# Patient Record
Sex: MALE | Race: BLACK OR AFRICAN AMERICAN | NOT HISPANIC OR LATINO | URBAN - METROPOLITAN AREA
[De-identification: names, ages, dates, MRNs, and addresses within clinical notes are randomized per-mention and may not be internally consistent; named-entity substitution may affect disease eponyms.]

---

## 2017-02-11 ENCOUNTER — EMERGENCY (EMERGENCY)
Facility: HOSPITAL | Age: 32
LOS: 1 days | Discharge: HOME | End: 2017-02-11
Admitting: EMERGENCY MEDICINE
Payer: MEDICAID

## 2017-02-11 VITALS
HEART RATE: 89 BPM | RESPIRATION RATE: 16 BRPM | OXYGEN SATURATION: 100 % | DIASTOLIC BLOOD PRESSURE: 72 MMHG | SYSTOLIC BLOOD PRESSURE: 149 MMHG

## 2017-02-11 VITALS
TEMPERATURE: 98 F | HEART RATE: 100 BPM | DIASTOLIC BLOOD PRESSURE: 84 MMHG | RESPIRATION RATE: 18 BRPM | SYSTOLIC BLOOD PRESSURE: 171 MMHG | OXYGEN SATURATION: 100 %

## 2017-02-11 PROCEDURE — 99283 EMERGENCY DEPT VISIT LOW MDM: CPT

## 2017-02-11 RX ORDER — ACETAMINOPHEN 500 MG
650 TABLET ORAL ONCE
Qty: 0 | Refills: 0 | Status: COMPLETED | OUTPATIENT
Start: 2017-02-11 | End: 2017-02-11

## 2017-02-11 RX ORDER — IBUPROFEN 200 MG
1 TABLET ORAL
Qty: 10 | Refills: 0 | OUTPATIENT
Start: 2017-02-11

## 2017-02-11 RX ORDER — OXYCODONE HYDROCHLORIDE 5 MG/1
1 TABLET ORAL
Qty: 10 | Refills: 0 | OUTPATIENT
Start: 2017-02-11

## 2017-02-11 RX ADMIN — Medication 650 MILLIGRAM(S): at 11:48

## 2017-02-11 NOTE — ED PROVIDER NOTE - PLAN OF CARE
Follow up with your Dentist or call the clinic to schedule an appointment [phone].  Follow up with your Doctor for blood pressure check.  Soft diet eat on opposite side of mouth.  Take Motrin 600mg every 8 hours as needed for pain take with food.  Percocet 5/325mg  1 tablet every 6 hrs as needed for severe pain. Do not drive or drink alcohol while taking this medications.  Return to the ER for any persistent/worsening or new symptoms swelling, fevers, chills or any concerning symptoms.

## 2017-02-11 NOTE — ED PROVIDER NOTE - OBJECTIVE STATEMENT
32 y/o male with a hx of HTN(not on meds) presents to the ER c/o right lower dental pain x 2 days.  Pt reports gum swelling denies facial swelling.  Pt denies fevers, chills, difficulty eating/drinking, chest pain, shortness of breath.  Pt took Motrin this AM and is driving home.

## 2017-02-11 NOTE — ED PROVIDER NOTE - CARE PLAN
Principal Discharge DX:	Tooth pain Principal Discharge DX:	Tooth pain  Instructions for follow-up, activity and diet:	Follow up with your Dentist or call the clinic to schedule an appointment [phone].  Follow up with your Doctor for blood pressure check.  Soft diet eat on opposite side of mouth.  Take Motrin 600mg every 8 hours as needed for pain take with food.  Percocet 5/325mg  1 tablet every 6 hrs as needed for severe pain. Do not drive or drink alcohol while taking this medications.  Return to the ER for any persistent/worsening or new symptoms swelling, fevers, chills or any concerning symptoms. Principal Discharge DX:	Tooth pain  Instructions for follow-up, activity and diet:	Follow up with your Dentist or call the clinic to schedule an appointment [phone].  Follow up with your Doctor for blood pressure check.  Soft diet eat on opposite side of mouth.  Take Motrin 600mg every 8 hours as needed for pain take with food.  Percocet 5/325mg  1 tablet every 6 hrs as needed for severe pain. Do not drive or drink alcohol while taking this medications.  Return to the ER for any persistent/worsening or new symptoms swelling, fevers, chills or any concerning symptoms.

## 2022-05-12 ENCOUNTER — EMERGENCY (EMERGENCY)
Facility: HOSPITAL | Age: 37
LOS: 1 days | Discharge: HOME | End: 2022-05-12
Attending: EMERGENCY MEDICINE | Admitting: EMERGENCY MEDICINE
Payer: MEDICAID

## 2022-05-12 VITALS
DIASTOLIC BLOOD PRESSURE: 56 MMHG | TEMPERATURE: 98 F | HEART RATE: 77 BPM | RESPIRATION RATE: 18 BRPM | OXYGEN SATURATION: 98 % | SYSTOLIC BLOOD PRESSURE: 134 MMHG

## 2022-05-12 VITALS
HEART RATE: 76 BPM | RESPIRATION RATE: 18 BRPM | SYSTOLIC BLOOD PRESSURE: 142 MMHG | OXYGEN SATURATION: 100 % | DIASTOLIC BLOOD PRESSURE: 50 MMHG | TEMPERATURE: 97 F

## 2022-05-12 PROBLEM — I10 ESSENTIAL (PRIMARY) HYPERTENSION: Chronic | Status: ACTIVE | Noted: 2017-02-11

## 2022-05-12 PROCEDURE — 93010 ELECTROCARDIOGRAM REPORT: CPT

## 2022-05-12 PROCEDURE — 99284 EMERGENCY DEPT VISIT MOD MDM: CPT | Mod: 25

## 2022-05-12 PROCEDURE — 71046 X-RAY EXAM CHEST 2 VIEWS: CPT | Mod: 26

## 2022-05-12 NOTE — ED PROVIDER NOTE - PHYSICAL EXAMINATION
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 able to speak in full and clear sentences. no wheeze, rales or stridor.  ambulatory no distress

## 2022-05-12 NOTE — ED PROVIDER NOTE - PATIENT PORTAL LINK FT
You can access the FollowMyHealth Patient Portal offered by [facility] by registering at the following website: [email or web address]. By joining [facility]’s FollowMyHealth portal, you will also be able to view your health information using other applications (apps) compatible with our system.

## 2022-05-12 NOTE — ED PROVIDER NOTE - OBJECTIVE STATEMENT
Dr Hollis  36yoM denies sig pmhx pw sob x months. Feels "my airways are not open" felt "couldn't breath today" came to ED. pt states he saw his PMD for SOB in March 2022 had been referred for cxr, however not done. no fever no cough no chest pain . no n/v. +smoker daily. no travel. not vaccinated for covid  chart review hx of HTN not on meds.

## 2022-05-12 NOTE — ED ADULT TRIAGE NOTE - CHIEF COMPLAINT QUOTE
pt c/o intermittent chest heaviness and sob x 6 months. states " I feel like im not getting enough oxygen." respirations even and unlabored. no pmh. pt is diaphoretic in triage, states " I feel anxious." pt c/o intermittent chest heaviness and sob x 6 months. states " I feel like im not getting enough oxygen." respirations even and unlabored. no pmh.  states " I feel anxious."

## 2022-05-12 NOTE — ED ADULT NURSE REASSESSMENT NOTE - NS ED NURSE REASSESS COMMENT FT1
pt A&ox4, awake and alert, at this time denies SOB and chest pain, breathing is spontaneous and unlabored. awaiting xray results.